# Patient Record
Sex: MALE | Race: WHITE | NOT HISPANIC OR LATINO
[De-identification: names, ages, dates, MRNs, and addresses within clinical notes are randomized per-mention and may not be internally consistent; named-entity substitution may affect disease eponyms.]

---

## 2017-11-09 ENCOUNTER — RX RENEWAL (OUTPATIENT)
Age: 62
End: 2017-11-09

## 2018-02-07 ENCOUNTER — APPOINTMENT (OUTPATIENT)
Dept: FAMILY MEDICINE | Facility: CLINIC | Age: 63
End: 2018-02-07
Payer: SELF-PAY

## 2018-02-07 VITALS
RESPIRATION RATE: 14 BRPM | WEIGHT: 250 LBS | SYSTOLIC BLOOD PRESSURE: 126 MMHG | HEART RATE: 60 BPM | DIASTOLIC BLOOD PRESSURE: 80 MMHG | BODY MASS INDEX: 35 KG/M2 | HEIGHT: 71 IN | OXYGEN SATURATION: 98 %

## 2018-02-07 DIAGNOSIS — Z83.3 FAMILY HISTORY OF DIABETES MELLITUS: ICD-10-CM

## 2018-02-07 DIAGNOSIS — Z95.5 PRESENCE OF CORONARY ANGIOPLASTY IMPLANT AND GRAFT: ICD-10-CM

## 2018-02-07 DIAGNOSIS — G47.33 OBSTRUCTIVE SLEEP APNEA (ADULT) (PEDIATRIC): ICD-10-CM

## 2018-02-07 DIAGNOSIS — B19.20 UNSPECIFIED VIRAL HEPATITIS C W/OUT HEPATIC COMA: ICD-10-CM

## 2018-02-07 DIAGNOSIS — Z82.49 FAMILY HISTORY OF ISCHEMIC HEART DISEASE AND OTHER DISEASES OF THE CIRCULATORY SYSTEM: ICD-10-CM

## 2018-02-07 PROCEDURE — 99213 OFFICE O/P EST LOW 20 MIN: CPT

## 2018-02-07 RX ORDER — ROSUVASTATIN CALCIUM 10 MG/1
10 TABLET, FILM COATED ORAL
Qty: 90 | Refills: 0 | Status: ACTIVE | COMMUNITY
Start: 2017-08-22

## 2018-02-12 LAB — COMPREHENSIVE SCREEN URINE: NORMAL

## 2018-05-04 ENCOUNTER — APPOINTMENT (OUTPATIENT)
Dept: FAMILY MEDICINE | Facility: CLINIC | Age: 63
End: 2018-05-04
Payer: SELF-PAY

## 2018-05-04 VITALS
OXYGEN SATURATION: 97 % | DIASTOLIC BLOOD PRESSURE: 85 MMHG | SYSTOLIC BLOOD PRESSURE: 136 MMHG | BODY MASS INDEX: 35 KG/M2 | RESPIRATION RATE: 14 BRPM | HEART RATE: 73 BPM | WEIGHT: 250 LBS | HEIGHT: 71 IN

## 2018-05-04 DIAGNOSIS — M15.9 POLYOSTEOARTHRITIS, UNSPECIFIED: ICD-10-CM

## 2018-05-04 DIAGNOSIS — J30.1 ALLERGIC RHINITIS DUE TO POLLEN: ICD-10-CM

## 2018-05-04 PROCEDURE — 99214 OFFICE O/P EST MOD 30 MIN: CPT

## 2018-05-04 RX ORDER — VARENICLINE TARTRATE 0.5 (11)-1
0.5 MG X 11 & KIT ORAL
Qty: 53 | Refills: 0 | Status: DISCONTINUED | COMMUNITY
Start: 2017-07-15 | End: 2018-05-04

## 2018-05-04 RX ORDER — CLOPIDOGREL BISULFATE 75 MG/1
75 TABLET, FILM COATED ORAL
Qty: 90 | Refills: 0 | Status: DISCONTINUED | COMMUNITY
Start: 2017-06-22 | End: 2018-05-04

## 2018-05-04 RX ORDER — ZOLPIDEM TARTRATE 5 MG/1
5 TABLET ORAL
Qty: 30 | Refills: 0 | Status: DISCONTINUED | COMMUNITY
Start: 2017-08-17 | End: 2018-05-04

## 2018-08-04 ENCOUNTER — APPOINTMENT (OUTPATIENT)
Dept: FAMILY MEDICINE | Facility: CLINIC | Age: 63
End: 2018-08-04
Payer: SELF-PAY

## 2018-08-04 VITALS
RESPIRATION RATE: 14 BRPM | HEART RATE: 54 BPM | HEIGHT: 71 IN | SYSTOLIC BLOOD PRESSURE: 122 MMHG | BODY MASS INDEX: 35 KG/M2 | OXYGEN SATURATION: 97 % | DIASTOLIC BLOOD PRESSURE: 78 MMHG | WEIGHT: 250 LBS

## 2018-08-04 DIAGNOSIS — E66.01 MORBID (SEVERE) OBESITY DUE TO EXCESS CALORIES: ICD-10-CM

## 2018-08-04 PROCEDURE — G0447 BEHAVIOR COUNSEL OBESITY 15M: CPT

## 2018-08-04 PROCEDURE — 99213 OFFICE O/P EST LOW 20 MIN: CPT | Mod: 25

## 2018-08-04 NOTE — REVIEW OF SYSTEMS
[Recent Change In Weight] : ~T recent weight change [Insomnia] : insomnia [Anxiety] : anxiety [Negative] : Heme/Lymph [FreeTextEntry2] : weight loss

## 2018-08-04 NOTE — COUNSELING
[Weight management counseling provided] : Weight management [None] : None [Good understanding] : Patient has a good understanding of lifestyle changes and the steps needed to achieve self management goals [ - Behavioral Counseling for Obesity (Face-to-Face for 15 Minutes)] : Behavioral Counseling for Obesity (Face-to-Face for 15 Minutes)

## 2018-08-04 NOTE — ASSESSMENT
[FreeTextEntry1] : he was advised to slowly add healthy carbs at breakfast and lunch and to follow up with cardiology as scheduled, I renewed his alprazolam and checked I-STOP, ref #[id number]

## 2018-08-04 NOTE — HISTORY OF PRESENT ILLNESS
[FreeTextEntry1] : med check, + diet talk [de-identified] : He went on Whole 30 which is a strict low sugar and low carb diet and has lost 15 pounds in 28 days.  His highest weight was 267.  He eats meat and some fruit.  He is craving bread.  He presents for a renewal of alprazolam and the dose works well for him.  He has an upcoming appointment with his cardiologist

## 2018-10-26 ENCOUNTER — APPOINTMENT (OUTPATIENT)
Dept: FAMILY MEDICINE | Facility: CLINIC | Age: 63
End: 2018-10-26
Payer: SELF-PAY

## 2018-10-26 VITALS
HEART RATE: 54 BPM | OXYGEN SATURATION: 97 % | WEIGHT: 244 LBS | RESPIRATION RATE: 14 BRPM | BODY MASS INDEX: 34.03 KG/M2 | DIASTOLIC BLOOD PRESSURE: 80 MMHG | SYSTOLIC BLOOD PRESSURE: 140 MMHG

## 2018-10-26 DIAGNOSIS — E78.5 HYPERLIPIDEMIA, UNSPECIFIED: ICD-10-CM

## 2018-10-26 PROCEDURE — 99213 OFFICE O/P EST LOW 20 MIN: CPT

## 2018-10-26 RX ORDER — ASPIRIN ENTERIC COATED TABLETS 81 MG 81 MG/1
81 TABLET, DELAYED RELEASE ORAL
Refills: 0 | Status: ACTIVE | COMMUNITY

## 2018-10-26 NOTE — REVIEW OF SYSTEMS
[Fatigue] : fatigue [Insomnia] : insomnia [Anxiety] : anxiety [Negative] : Genitourinary [Recent Change In Weight] : ~T recent weight change

## 2018-10-26 NOTE — ASSESSMENT
[FreeTextEntry1] : renew xanax\par  As per usual protocol the patient was advised in regards to the risks of driving when on medications with side effects of dizziness or drowsiness. Patient has been assessed  for increase risk for respiratory depression. Istop checked.\par \par Basic cardiovascular prevention measures are advised including regular exercise, surveillance medical examination, and prudent portion-controlled low fat diet, rich in a variety of vegetables with minimal added sugars, refined starches, and no artificially hydrogenated oils.\par \par refused flu shot\par obtain records from cardiology

## 2018-10-26 NOTE — HISTORY OF PRESENT ILLNESS
[FreeTextEntry1] : Pt is here for a medication renewal.  [de-identified] : 61 yo wm here for follow up on anxiety  I went on the whole 30 diet and I am watching my diet i have not been having carbs. I have no joint pain. I had gel shots 15 month ago. I am still working. I used to worry about my kids especially after 9/11. My daughter is  now and my other daughter is graduated and having a good job. I am not so worried about $ anymore. I am working less and making more. It might have to do with [employer]. I like this diet. When I went to [place] I ate carbs and all my pain came back  but we also did a lot of climbing , but we also ate and drank a lot. We had a really good time. I am seeing a cardiology  I had a stent in March 2017. He did blood flow tests. and they are clear. I feel good. I quit smoking too.I lost 25 pounds\par I was treated for hep c and lost 25 pounds and I stopped snoring and don’t have sleep apnea anymore. I don’t fall asleep on the road anymore.

## 2019-01-14 ENCOUNTER — APPOINTMENT (OUTPATIENT)
Dept: FAMILY MEDICINE | Facility: CLINIC | Age: 64
End: 2019-01-14
Payer: SELF-PAY

## 2019-01-14 VITALS
HEART RATE: 51 BPM | SYSTOLIC BLOOD PRESSURE: 124 MMHG | DIASTOLIC BLOOD PRESSURE: 74 MMHG | OXYGEN SATURATION: 98 % | RESPIRATION RATE: 14 BRPM | HEIGHT: 71 IN | WEIGHT: 244 LBS | BODY MASS INDEX: 34.16 KG/M2

## 2019-01-14 PROCEDURE — 99213 OFFICE O/P EST LOW 20 MIN: CPT

## 2019-01-14 NOTE — PHYSICAL EXAM
[No Acute Distress] : no acute distress [Well Nourished] : well nourished [Well Developed] : well developed [Well-Appearing] : well-appearing [Normal Outer Ear/Nose] : the outer ears and nose were normal in appearance [Normal TMs] : both tympanic membranes were normal [No JVD] : no jugular venous distention [Supple] : supple [No Lymphadenopathy] : no lymphadenopathy [No Respiratory Distress] : no respiratory distress  [Clear to Auscultation] : lungs were clear to auscultation bilaterally [Normal Rate] : normal rate  [Regular Rhythm] : with a regular rhythm [No Murmur] : no murmur heard [No Carotid Bruits] : no carotid bruits [Normal Posterior Cervical Nodes] : no posterior cervical lymphadenopathy [Normal Anterior Cervical Nodes] : no anterior cervical lymphadenopathy [Speech Grossly Normal] : speech grossly normal [Memory Grossly Normal] : memory grossly normal [Alert and Oriented x3] : oriented to person, place, and time [Normal Insight/Judgement] : insight and judgment were intact [de-identified] : watery nasal drainage/PND [de-identified] : anxious

## 2019-01-14 NOTE — ASSESSMENT
[FreeTextEntry1] : 1. Advised pt use mucinex DM and rx for atrovent  NS 2 NS tid/qid prn.   2. Rx for alprazolam sent in - increased anxiety  rx for 2 mg # 90.  Use/precautions/safety of medication all reviewed.  [place]  checked\par

## 2019-01-14 NOTE — REVIEW OF SYSTEMS
[Postnasal Drip] : postnasal drip [Nasal Discharge] : nasal discharge [Cough] : cough [Insomnia] : insomnia [Anxiety] : anxiety [Negative] : Heme/Lymph

## 2019-01-14 NOTE — HISTORY OF PRESENT ILLNESS
[FreeTextEntry1] : Patient presents for med check\par  [de-identified] : Having head congestion, runny nose, cough, no fever / chills.  Here for treatment of anxiety.

## 2019-01-14 NOTE — HEALTH RISK ASSESSMENT
[] : No [0] : 2) Feeling down, depressed, or hopeless: Not at all (0) [de-identified] : social [[id number]] : 0

## 2019-02-07 ENCOUNTER — RX RENEWAL (OUTPATIENT)
Age: 64
End: 2019-02-07

## 2019-02-07 RX ORDER — IPRATROPIUM BROMIDE 42 UG/1
0.06 SPRAY NASAL
Qty: 3 | Refills: 1 | Status: ACTIVE | COMMUNITY
Start: 2019-01-14 | End: 1900-01-01

## 2019-06-20 ENCOUNTER — APPOINTMENT (OUTPATIENT)
Dept: FAMILY MEDICINE | Facility: CLINIC | Age: 64
End: 2019-06-20
Payer: SELF-PAY

## 2019-06-20 VITALS
RESPIRATION RATE: 12 BRPM | OXYGEN SATURATION: 96 % | BODY MASS INDEX: 33.75 KG/M2 | WEIGHT: 242 LBS | HEART RATE: 57 BPM | DIASTOLIC BLOOD PRESSURE: 70 MMHG | SYSTOLIC BLOOD PRESSURE: 130 MMHG

## 2019-06-20 PROCEDURE — 99213 OFFICE O/P EST LOW 20 MIN: CPT

## 2019-06-20 NOTE — ASSESSMENT
[FreeTextEntry1] : renew meds \par  As per usual protocol the patient was advised in regards to the risks of driving when on medications with side effects of dizziness or drowsiness. Patient has been assessed  for increase risk for respiratory depression. Patient denies suicidal ideations or plan.  Istop checked.\par follow up with cardiology

## 2019-06-20 NOTE — HISTORY OF PRESENT ILLNESS
[FreeTextEntry1] : pt presents for med check  [de-identified] : 64 yo wm here for follow up on anxiety\par I saw my cardiology and my labs were perfects. I went on a WHOLE 30 diet and I lost 25 pounds. I ate chicken fish and vegetables. I had gel shots in my knees and they are helping. I am achy. I think it is old age. My anxiety is okay. It is just sleeping my mind doesn’tt stop. I checked the Internet and I am the type of person that talks to himself. I listen to a lot of radio  podcasts. I need to rewind and hear it again because I am not able tofocus and listen. I have been a bad sleeper all the time. The xanax relaxes my mind. I was without it last summer bc I was working and I didn’t sleep for days.I was stressed out it was a big job we had deadlines. % guys were under me and I was anxious.   I have had days when I don’t take it and I am ok. I obsess about a lot of things work and social. I am very good when I work. I have a hard time sitting .When I went on vacation to [place] and it was great we walked everywhere

## 2019-06-20 NOTE — PHYSICAL EXAM
[No JVD] : no jugular venous distention [Supple] : supple [No Respiratory Distress] : no respiratory distress  [Normal Rate] : normal rate  [Clear to Auscultation] : lungs were clear to auscultation bilaterally [Regular Rhythm] : with a regular rhythm [Normal S1, S2] : normal S1 and S2

## 2019-11-27 ENCOUNTER — APPOINTMENT (OUTPATIENT)
Dept: FAMILY MEDICINE | Facility: CLINIC | Age: 64
End: 2019-11-27
Payer: SELF-PAY

## 2019-11-27 VITALS
DIASTOLIC BLOOD PRESSURE: 70 MMHG | SYSTOLIC BLOOD PRESSURE: 142 MMHG | RESPIRATION RATE: 14 BRPM | WEIGHT: 240 LBS | OXYGEN SATURATION: 98 % | BODY MASS INDEX: 33.6 KG/M2 | HEART RATE: 62 BPM | HEIGHT: 71 IN

## 2019-11-27 DIAGNOSIS — I25.10 ATHEROSCLEROTIC HEART DISEASE OF NATIVE CORONARY ARTERY W/OUT ANGINA PECTORIS: ICD-10-CM

## 2019-11-27 DIAGNOSIS — J06.9 ACUTE UPPER RESPIRATORY INFECTION, UNSPECIFIED: ICD-10-CM

## 2019-11-27 PROCEDURE — 99213 OFFICE O/P EST LOW 20 MIN: CPT

## 2019-11-27 NOTE — PHYSICAL EXAM
[No JVD] : no jugular venous distention [Supple] : supple [No Respiratory Distress] : no respiratory distress  [Clear to Auscultation] : lungs were clear to auscultation bilaterally [Normal Rate] : normal rate  [Regular Rhythm] : with a regular rhythm [Normal S1, S2] : normal S1 and S2 [Muscle Spasms, Bilateral] : bilateral muscle spasms [Restricted] : was restricted [Pain] : was painful [Weakness] : right rotation weakness

## 2019-11-27 NOTE — HISTORY OF PRESENT ILLNESS
[FreeTextEntry1] : pt presents for med check  [de-identified] : 64 yo wm here for follow up on anxiety\par I am maintaining my weight. I stay away from sugar I eat chicken and fish. The rest of me is falling apart. I have pain in my knees and back I have right lower buttocks radiating down my leg. I went to work and my work is physical. I woke up today and felt worse. I took 2 naprosyn and I feel 90 percent better. \par I fell 20 years ago. I had this pain  9 years ago. I went to someone and he told me to take ibuprofen tid for 30 days . I did it and the pain went away. \par I have inflammation.  Can i take advil.?\par \par i need blood work for my lipids. and lft for my cardiology\par \par The xanax relaxes my mind. I take it for sleep mostly to relax my  mind.   I have had days when I don’t take it and I am ok. I obsess about a lot of things work and social. I am very good when I work. I have a hard time sitting .

## 2019-11-27 NOTE — ASSESSMENT
[FreeTextEntry1] : renew meds \par  As per usual protocol the patient was advised in regards to the risks of driving when on medications with side effects of dizziness or drowsiness. Patient has been assessed  for increase risk for respiratory depression. Patient denies suicidal ideations or plan.  Istop checked.\par follow up with cardiology\par  \par follow lab slip given to be done for full blood work at outside lab [facility] \par  The patient was instructed in the independent performance of a home exercise program that addresses the problems and achieving the goals of reducing pain and increasing range of motion. I suggest he get mri if no better or see orthopedic\par \par We spent sufficient time to discuss aspects of care; questions were answered  to patient's satisfaction.The diagnosis and care plan were discussed with patient in detail.  Patient test results were  reviewed and explained in full. All questions and concerns  were answered to the best of my knowledge.\par

## 2019-11-27 NOTE — HEALTH RISK ASSESSMENT
[Yes] : Yes [2 - 4 times a month (2 pts)] : 2-4 times a month (2 points) [1 or 2 (0 pts)] : 1 or 2 (0 points) [No falls in past year] : Patient reported no falls in the past year [] : No [de-identified] : cardiology [Audit-CScore] : 2 [de-identified] : work [de-identified] : healthy

## 2020-04-03 ENCOUNTER — APPOINTMENT (OUTPATIENT)
Dept: FAMILY MEDICINE | Facility: CLINIC | Age: 65
End: 2020-04-03
Payer: SELF-PAY

## 2020-04-03 VITALS
HEART RATE: 52 BPM | RESPIRATION RATE: 14 BRPM | OXYGEN SATURATION: 99 % | HEIGHT: 71 IN | SYSTOLIC BLOOD PRESSURE: 138 MMHG | WEIGHT: 235 LBS | DIASTOLIC BLOOD PRESSURE: 80 MMHG | BODY MASS INDEX: 32.9 KG/M2

## 2020-04-03 DIAGNOSIS — R53.83 OTHER FATIGUE: ICD-10-CM

## 2020-04-03 PROCEDURE — 99213 OFFICE O/P EST LOW 20 MIN: CPT

## 2020-04-03 RX ORDER — ALPRAZOLAM 1 MG/1
1 TABLET ORAL
Qty: 90 | Refills: 0 | Status: DISCONTINUED | COMMUNITY
Start: 2017-11-09 | End: 2020-04-03

## 2020-04-03 RX ORDER — VARENICLINE TARTRATE 1 MG/1
1 TABLET, FILM COATED ORAL
Qty: 56 | Refills: 0 | Status: DISCONTINUED | COMMUNITY
Start: 2018-09-25 | End: 2020-04-03

## 2020-04-03 RX ORDER — MELOXICAM 15 MG/1
15 TABLET ORAL
Qty: 30 | Refills: 0 | Status: DISCONTINUED | COMMUNITY
Start: 2017-08-17 | End: 2020-04-03

## 2020-04-03 NOTE — HISTORY OF PRESENT ILLNESS
[FreeTextEntry1] : pt presents for medication renewal  [de-identified] : He has been more anxious due to the COVID 19 pandemic.  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  He isn't sleeping well unless he takes the alprazolam and then still needs to take the zolpidem occasionally.  He denies any medication side effects.  He started smoking again and would like to resume chantix.

## 2020-04-03 NOTE — PLAN
[FreeTextEntry1] : I-STOP was checked and his medications were renewed, he will resume chantix and will stop smoking, he was given a new lab rx and will be notified of his results

## 2020-04-03 NOTE — PHYSICAL EXAM
[No Acute Distress] : no acute distress [Well Nourished] : well nourished [Well Developed] : well developed [Well-Appearing] : well-appearing [Normal Voice/Communication] : normal voice/communication [No Respiratory Distress] : no respiratory distress  [No Accessory Muscle Use] : no accessory muscle use [Clear to Auscultation] : lungs were clear to auscultation bilaterally [Normal Rate] : normal rate  [Regular Rhythm] : with a regular rhythm [Normal S1, S2] : normal S1 and S2 [Coordination Grossly Intact] : coordination grossly intact [Speech Grossly Normal] : speech grossly normal [Memory Grossly Normal] : memory grossly normal [Normal Affect] : the affect was normal [Normal Mood] : the mood was normal [Normal Insight/Judgement] : insight and judgment were intact

## 2020-04-03 NOTE — REVIEW OF SYSTEMS
[Fatigue] : fatigue [Insomnia] : insomnia [Anxiety] : anxiety [Negative] : Heme/Lymph [Suicidal] : not suicidal [Depression] : no depression

## 2020-08-04 ENCOUNTER — RX CHANGE (OUTPATIENT)
Age: 65
End: 2020-08-04

## 2020-08-04 RX ORDER — VARENICLINE TARTRATE 1 MG/1
1 TABLET, FILM COATED ORAL
Qty: 56 | Refills: 4 | Status: ACTIVE | COMMUNITY
Start: 2020-08-04 | End: 1900-01-01

## 2020-08-04 RX ORDER — VARENICLINE TARTRATE 1 MG/1
1 TABLET, FILM COATED ORAL
Qty: 56 | Refills: 4 | Status: DISCONTINUED | COMMUNITY
Start: 2020-05-01 | End: 2020-08-04

## 2020-09-18 ENCOUNTER — APPOINTMENT (OUTPATIENT)
Dept: FAMILY MEDICINE | Facility: CLINIC | Age: 65
End: 2020-09-18
Payer: SELF-PAY

## 2020-09-18 VITALS
WEIGHT: 245 LBS | TEMPERATURE: 97.5 F | OXYGEN SATURATION: 98 % | HEART RATE: 53 BPM | BODY MASS INDEX: 34.3 KG/M2 | DIASTOLIC BLOOD PRESSURE: 90 MMHG | RESPIRATION RATE: 14 BRPM | HEIGHT: 71 IN | SYSTOLIC BLOOD PRESSURE: 130 MMHG

## 2020-09-18 VITALS — SYSTOLIC BLOOD PRESSURE: 118 MMHG | DIASTOLIC BLOOD PRESSURE: 80 MMHG

## 2020-09-18 DIAGNOSIS — G47.00 INSOMNIA, UNSPECIFIED: ICD-10-CM

## 2020-09-18 DIAGNOSIS — Z87.891 PERSONAL HISTORY OF NICOTINE DEPENDENCE: ICD-10-CM

## 2020-09-18 DIAGNOSIS — F41.9 ANXIETY DISORDER, UNSPECIFIED: ICD-10-CM

## 2020-09-18 DIAGNOSIS — Z12.5 ENCOUNTER FOR SCREENING FOR MALIGNANT NEOPLASM OF PROSTATE: ICD-10-CM

## 2020-09-18 DIAGNOSIS — Z23 ENCOUNTER FOR IMMUNIZATION: ICD-10-CM

## 2020-09-18 DIAGNOSIS — F17.200 NICOTINE DEPENDENCE, UNSPECIFIED, UNCOMPLICATED: ICD-10-CM

## 2020-09-18 PROCEDURE — 99212 OFFICE O/P EST SF 10 MIN: CPT

## 2020-09-18 RX ORDER — ALPRAZOLAM 2 MG/1
2 TABLET ORAL
Qty: 90 | Refills: 0 | Status: ACTIVE | COMMUNITY
Start: 2019-01-14 | End: 1900-01-01

## 2020-09-18 RX ORDER — VARENICLINE TARTRATE 0.5 (11)-1
0.5 MG X 11 & KIT ORAL
Qty: 1 | Refills: 0 | Status: DISCONTINUED | COMMUNITY
Start: 2020-04-03 | End: 2020-09-18

## 2020-09-18 NOTE — HISTORY OF PRESENT ILLNESS
[FreeTextEntry1] : Patient present for medication renewal\par  [de-identified] : He would like to renew the alprazolam that he takes at night to help him fall asleep and the zolpidem that he takes occasionally if he hasn't slept in a while.  He is still on the chantix and has stopped smoking.

## 2020-09-18 NOTE — PLAN
[FreeTextEntry1] : a controlled substances agreement was completed, after checking I-STOP his medications were renewed, he was given a new lab rx, he will follow up with cardiology as scheduled

## 2020-09-22 RX ORDER — ZOLPIDEM TARTRATE 5 MG/1
5 TABLET ORAL
Qty: 30 | Refills: 5 | Status: ACTIVE | COMMUNITY
Start: 2018-05-04

## 2024-01-29 ENCOUNTER — TRANSCRIPTION ENCOUNTER (OUTPATIENT)
Age: 69
End: 2024-01-29

## 2025-04-15 NOTE — CURRENT MEDS
[Takes medication as prescribed] : takes [None] : Patient does not have any barriers to medication adherence 15-Apr-2025 15-Apr-2025 17:50